# Patient Record
Sex: FEMALE | Race: WHITE | NOT HISPANIC OR LATINO | Employment: FULL TIME | URBAN - METROPOLITAN AREA
[De-identification: names, ages, dates, MRNs, and addresses within clinical notes are randomized per-mention and may not be internally consistent; named-entity substitution may affect disease eponyms.]

---

## 2023-05-04 LAB — FOLLITROPIN (IU/L) IN SER/PLAS: 7.7 IU/L

## 2023-07-27 PROBLEM — M79.18 PAIN IN RIGHT BUTTOCK: Status: ACTIVE | Noted: 2023-07-27

## 2023-07-27 PROBLEM — D22.9 MULTIPLE MELANOCYTIC NEVUS: Status: ACTIVE | Noted: 2023-07-27

## 2023-07-27 PROBLEM — I63.9 CEREBROVASCULAR ACCIDENT (CVA) (MULTI): Status: ACTIVE | Noted: 2023-07-27

## 2023-07-27 PROBLEM — N95.1 PERIMENOPAUSE: Status: ACTIVE | Noted: 2023-07-27

## 2023-07-27 PROBLEM — J45.20 ASTHMA, MILD INTERMITTENT (HHS-HCC): Status: ACTIVE | Noted: 2023-07-27

## 2023-07-27 RX ORDER — ASPIRIN 81 MG/1
1 TABLET ORAL DAILY
COMMUNITY
Start: 2017-07-18

## 2023-07-27 RX ORDER — FLUTICASONE PROPIONATE AND SALMETEROL 250; 50 UG/1; UG/1
1 POWDER RESPIRATORY (INHALATION)
COMMUNITY
Start: 2019-09-24 | End: 2023-07-31 | Stop reason: SDUPTHER

## 2023-07-27 RX ORDER — ALBUTEROL SULFATE 90 UG/1
AEROSOL, METERED RESPIRATORY (INHALATION)
COMMUNITY
Start: 2018-09-04 | End: 2023-07-31 | Stop reason: SDUPTHER

## 2023-07-31 ENCOUNTER — OFFICE VISIT (OUTPATIENT)
Dept: PRIMARY CARE | Facility: CLINIC | Age: 43
End: 2023-07-31
Payer: COMMERCIAL

## 2023-07-31 VITALS
BODY MASS INDEX: 28.7 KG/M2 | SYSTOLIC BLOOD PRESSURE: 126 MMHG | TEMPERATURE: 96.6 F | WEIGHT: 162 LBS | DIASTOLIC BLOOD PRESSURE: 68 MMHG | HEART RATE: 73 BPM | HEIGHT: 63 IN | OXYGEN SATURATION: 98 % | RESPIRATION RATE: 18 BRPM

## 2023-07-31 DIAGNOSIS — J45.20 MILD INTERMITTENT ASTHMA, UNSPECIFIED WHETHER COMPLICATED (HHS-HCC): Primary | ICD-10-CM

## 2023-07-31 DIAGNOSIS — Z86.73 HISTORY OF CVA IN ADULTHOOD: ICD-10-CM

## 2023-07-31 PROCEDURE — 1036F TOBACCO NON-USER: CPT | Performed by: INTERNAL MEDICINE

## 2023-07-31 PROCEDURE — 99214 OFFICE O/P EST MOD 30 MIN: CPT | Performed by: INTERNAL MEDICINE

## 2023-07-31 RX ORDER — FLUTICASONE PROPIONATE 50 MCG
2 SPRAY, SUSPENSION (ML) NASAL DAILY
Qty: 16 G | Refills: 11 | Status: SHIPPED | OUTPATIENT
Start: 2023-07-31 | End: 2023-08-22

## 2023-07-31 RX ORDER — FLUTICASONE PROPIONATE AND SALMETEROL 250; 50 UG/1; UG/1
1 POWDER RESPIRATORY (INHALATION)
Qty: 60 EACH | Refills: 11 | Status: SHIPPED | OUTPATIENT
Start: 2023-07-31 | End: 2024-05-13 | Stop reason: SDUPTHER

## 2023-07-31 RX ORDER — FLUTICASONE PROPIONATE 50 MCG
2 SPRAY, SUSPENSION (ML) NASAL DAILY
COMMUNITY
Start: 2015-09-03 | End: 2023-07-31 | Stop reason: SDUPTHER

## 2023-07-31 RX ORDER — ALBUTEROL SULFATE 90 UG/1
AEROSOL, METERED RESPIRATORY (INHALATION)
Qty: 18 G | Refills: 11 | Status: SHIPPED | OUTPATIENT
Start: 2023-07-31 | End: 2024-05-13 | Stop reason: SDUPTHER

## 2023-07-31 ASSESSMENT — PATIENT HEALTH QUESTIONNAIRE - PHQ9
1. LITTLE INTEREST OR PLEASURE IN DOING THINGS: NOT AT ALL
SUM OF ALL RESPONSES TO PHQ9 QUESTIONS 1 AND 2: 0
2. FEELING DOWN, DEPRESSED OR HOPELESS: NOT AT ALL

## 2023-07-31 NOTE — PROGRESS NOTES
"My nurse note reviewed. Patient is here for:  Follow-up (Discuss breathing part of CPE work physical failed.)   Pt works as  and she had respirator testing done at work as part of their assessment and she could not do lung functions test well. Also as per pt she was advised to have clearance for her hx of clot in brain and breathing.   Pt was feeling fine so she stopped all her asthma meds few months back when she ran out. Needs refill now    OBJECTIVE :  Blood pressure 126/68, pulse 73, temperature 35.9 °C (96.6 °F), resp. rate 18, height 1.6 m (5' 3\"), weight 73.5 kg (162 lb), SpO2 98 %.  CVS: S1 S2 + , no S3. No loud heart murmur appreciated. Lungs clear, No edema  CNS: Patient is alert, oriented moving all 4 extremities well. No motor weakness noted on gross neurological exam. No involuntary movements or tremors noted.  DTR ++ both knees and wrists       Assessment:  1. Mild intermittent asthma, unspecified whether complicated  albuterol 90 mcg/actuation inhaler    fluticasone (Flonase) 50 mcg/actuation nasal spray    fluticasone propion-salmeteroL (Advair Diskus) 250-50 mcg/dose diskus inhaler    Pulmonary function testing (Ancillary Performed)    XR chest 2 views    XR chest 2 views      2. History of CVA in adulthood  In 2014, no deficit now.           Plan   L;billie function test   Advised to take her medications regularly   Requested prescription/s refill done to the pharmacy of patient choice .  She is doing fine with hx of stroke standpoint.   Medical letter in regards to Neuro clearance signed. For respirator clearance will have to decide after above tests   F/U in October for physical exam   "

## 2023-07-31 NOTE — PATIENT INSTRUCTIONS
Plan   L;billie function test   Advised to take her medications regularly   Requested prescription/s refill done to the pharmacy of patient choice .  She is doing fine with hx of stroke standpoint.   Medical letter in regards to Neuro clearance signed. For respirator clearance will have to decide after above tests   F/U in October for physical exam

## 2023-08-01 ENCOUNTER — TELEPHONE (OUTPATIENT)
Dept: PRIMARY CARE | Facility: CLINIC | Age: 43
End: 2023-08-01
Payer: COMMERCIAL

## 2023-08-01 NOTE — TELEPHONE ENCOUNTER
----- Message from Leeroy Haq MD sent at 8/1/2023 10:27 AM EDT -----  Chest xray was normal. Please notify patient. Thanks.

## 2023-08-22 DIAGNOSIS — J45.20 MILD INTERMITTENT ASTHMA, UNSPECIFIED WHETHER COMPLICATED (HHS-HCC): ICD-10-CM

## 2023-08-22 RX ORDER — FLUTICASONE PROPIONATE 50 MCG
2 SPRAY, SUSPENSION (ML) NASAL DAILY
Qty: 16 ML | Refills: 11 | Status: SHIPPED | OUTPATIENT
Start: 2023-08-22

## 2023-10-16 DIAGNOSIS — Z86.73 HISTORY OF CVA IN ADULTHOOD: ICD-10-CM

## 2023-10-16 DIAGNOSIS — J45.20 MILD INTERMITTENT ASTHMA, UNSPECIFIED WHETHER COMPLICATED (HHS-HCC): Primary | ICD-10-CM

## 2023-10-19 ENCOUNTER — LAB (OUTPATIENT)
Dept: LAB | Facility: LAB | Age: 43
End: 2023-10-19
Payer: COMMERCIAL

## 2023-10-19 ENCOUNTER — OFFICE VISIT (OUTPATIENT)
Dept: PRIMARY CARE | Facility: CLINIC | Age: 43
End: 2023-10-19
Payer: COMMERCIAL

## 2023-10-19 VITALS
OXYGEN SATURATION: 95 % | DIASTOLIC BLOOD PRESSURE: 87 MMHG | WEIGHT: 160 LBS | BODY MASS INDEX: 28.35 KG/M2 | TEMPERATURE: 97.2 F | HEART RATE: 69 BPM | SYSTOLIC BLOOD PRESSURE: 129 MMHG | HEIGHT: 63 IN

## 2023-10-19 DIAGNOSIS — Z00.00 ANNUAL PHYSICAL EXAM: Primary | ICD-10-CM

## 2023-10-19 DIAGNOSIS — Z86.73 HISTORY OF CVA IN ADULTHOOD: ICD-10-CM

## 2023-10-19 DIAGNOSIS — Z12.11 ENCOUNTER FOR SCREENING FOR MALIGNANT NEOPLASM OF COLON: ICD-10-CM

## 2023-10-19 DIAGNOSIS — J45.20 MILD INTERMITTENT ASTHMA WITHOUT COMPLICATION (HHS-HCC): ICD-10-CM

## 2023-10-19 DIAGNOSIS — I63.9 CEREBROVASCULAR ACCIDENT (CVA), UNSPECIFIED MECHANISM (MULTI): ICD-10-CM

## 2023-10-19 LAB
ALBUMIN SERPL BCP-MCNC: 4.4 G/DL (ref 3.4–5)
ALP SERPL-CCNC: 39 U/L (ref 33–110)
ALT SERPL W P-5'-P-CCNC: 17 U/L (ref 7–45)
ANION GAP SERPL CALC-SCNC: 10 MMOL/L (ref 10–20)
AST SERPL W P-5'-P-CCNC: 17 U/L (ref 9–39)
BASOPHILS # BLD AUTO: 0.05 X10*3/UL (ref 0–0.1)
BASOPHILS NFR BLD AUTO: 0.6 %
BILIRUB DIRECT SERPL-MCNC: 0.1 MG/DL (ref 0–0.3)
BILIRUB SERPL-MCNC: 0.6 MG/DL (ref 0–1.2)
BUN SERPL-MCNC: 15 MG/DL (ref 6–23)
CALCIUM SERPL-MCNC: 9.2 MG/DL (ref 8.6–10.3)
CHLORIDE SERPL-SCNC: 104 MMOL/L (ref 98–107)
CHOLEST SERPL-MCNC: 216 MG/DL (ref 0–199)
CHOLESTEROL/HDL RATIO: 3.7
CO2 SERPL-SCNC: 27 MMOL/L (ref 21–32)
CREAT SERPL-MCNC: 0.92 MG/DL (ref 0.5–1.05)
EOSINOPHIL # BLD AUTO: 0.22 X10*3/UL (ref 0–0.7)
EOSINOPHIL NFR BLD AUTO: 2.8 %
ERYTHROCYTE [DISTWIDTH] IN BLOOD BY AUTOMATED COUNT: 11.9 % (ref 11.5–14.5)
GFR SERPL CREATININE-BSD FRML MDRD: 79 ML/MIN/1.73M*2
GLUCOSE SERPL-MCNC: 89 MG/DL (ref 74–99)
HCT VFR BLD AUTO: 45.4 % (ref 36–46)
HDLC SERPL-MCNC: 58 MG/DL
HGB BLD-MCNC: 15.1 G/DL (ref 12–16)
IMM GRANULOCYTES # BLD AUTO: 0.01 X10*3/UL (ref 0–0.7)
IMM GRANULOCYTES NFR BLD AUTO: 0.1 % (ref 0–0.9)
LYMPHOCYTES # BLD AUTO: 2.41 X10*3/UL (ref 1.2–4.8)
LYMPHOCYTES NFR BLD AUTO: 30.7 %
MCH RBC QN AUTO: 31.3 PG (ref 26–34)
MCHC RBC AUTO-ENTMCNC: 33.3 G/DL (ref 32–36)
MCV RBC AUTO: 94 FL (ref 80–100)
MONOCYTES # BLD AUTO: 0.62 X10*3/UL (ref 0.1–1)
MONOCYTES NFR BLD AUTO: 7.9 %
NEUTROPHILS # BLD AUTO: 4.54 X10*3/UL (ref 1.2–7.7)
NEUTROPHILS NFR BLD AUTO: 57.9 %
NON-HDL CHOLESTEROL: 158 MG/DL (ref 0–149)
NRBC BLD-RTO: 0 /100 WBCS (ref 0–0)
PLATELET # BLD AUTO: 274 X10*3/UL (ref 150–450)
PMV BLD AUTO: 10 FL (ref 7.5–11.5)
POTASSIUM SERPL-SCNC: 4.4 MMOL/L (ref 3.5–5.3)
PROT SERPL-MCNC: 7.1 G/DL (ref 6.4–8.2)
RBC # BLD AUTO: 4.83 X10*6/UL (ref 4–5.2)
SODIUM SERPL-SCNC: 137 MMOL/L (ref 136–145)
TSH SERPL-ACNC: 1.57 MIU/L (ref 0.44–3.98)
WBC # BLD AUTO: 7.9 X10*3/UL (ref 4.4–11.3)

## 2023-10-19 PROCEDURE — 1036F TOBACCO NON-USER: CPT | Performed by: INTERNAL MEDICINE

## 2023-10-19 PROCEDURE — 99396 PREV VISIT EST AGE 40-64: CPT | Performed by: INTERNAL MEDICINE

## 2023-10-19 PROCEDURE — 36415 COLL VENOUS BLD VENIPUNCTURE: CPT

## 2023-10-19 PROCEDURE — 83718 ASSAY OF LIPOPROTEIN: CPT

## 2023-10-19 PROCEDURE — 90682 RIV4 VACC RECOMBINANT DNA IM: CPT | Performed by: INTERNAL MEDICINE

## 2023-10-19 PROCEDURE — 99213 OFFICE O/P EST LOW 20 MIN: CPT | Performed by: INTERNAL MEDICINE

## 2023-10-19 PROCEDURE — 80053 COMPREHEN METABOLIC PANEL: CPT

## 2023-10-19 PROCEDURE — 82465 ASSAY BLD/SERUM CHOLESTEROL: CPT

## 2023-10-19 PROCEDURE — 84443 ASSAY THYROID STIM HORMONE: CPT

## 2023-10-19 PROCEDURE — 85025 COMPLETE CBC W/AUTO DIFF WBC: CPT

## 2023-10-19 PROCEDURE — 90471 IMMUNIZATION ADMIN: CPT | Performed by: INTERNAL MEDICINE

## 2023-10-19 PROCEDURE — 82248 BILIRUBIN DIRECT: CPT

## 2023-10-19 ASSESSMENT — PROMIS GLOBAL HEALTH SCALE
RATE_QUALITY_OF_LIFE: GOOD
RATE_AVERAGE_FATIGUE: MODERATE
CARRYOUT_PHYSICAL_ACTIVITIES: COMPLETELY
RATE_SOCIAL_SATISFACTION: GOOD
CARRYOUT_SOCIAL_ACTIVITIES: GOOD
RATE_AVERAGE_PAIN: 3
RATE_GENERAL_HEALTH: VERY GOOD
RATE_PHYSICAL_HEALTH: GOOD
EMOTIONAL_PROBLEMS: RARELY
RATE_MENTAL_HEALTH: GOOD

## 2023-10-19 NOTE — PROGRESS NOTES
"My nurse note reviewed. Patient is here for:  Annual Exam       Here for physical and f/U on asthma, stroke  Hx of stroke in 2014 . At that time had speech and visual symptoms. No weakness in arm or legs  Patient denies any shortness of breath, PND, orthopnea, chest pain , palpitation, syncope or edema in legs  patient denies any abdominal pain, tenderness, nausea, vomiting, change in bowel habits or blood in stool.  Patient denies any weakness in extremities.. Denies any headache, visual symptoms , speech problems or  tremors . No TIA or stroke like symptoms..    Taking med ok   ,   Works as    Had PFT done at [facility]. Did not see results   Taking meds ok   Has form for physical to be filled out    OBJECTIVE :  /87 (BP Location: Right arm, Patient Position: Sitting)   Pulse 69   Temp 36.2 °C (97.2 °F)   Ht 1.6 m (5' 3\")   Wt 72.6 kg (160 lb)   LMP 10/05/2023 (Approximate)   SpO2 95%   BMI 28.34 kg/m²   Vitals noted   Not in acute distress  Conj Pink, No icterus  Neck:No Cervical LN enlargement, No Thyroid enlargement No carotid bruit  Lungs: good air entry bilaterally, no rales or rhonchi  CVS: S1 S2 + , no S3. No loud heart murmur heard.   Abdomen: Soft, non tender , BS +. no organomegaly , no CVA tenderness  MSK: No spine tenderness or muscle tenderness noted on gross examination  CNS: Pt is alert, moving all 4 extremities. no motor weakness or abnormal movements noted on gross examination.  Extremities: No edema, No calf tenderness, Kang's sign negative.  Sees obgyn for well woman check .    Assessment:  1. Annual physical exam  Done. Tests orderdd      2. Mild intermittent asthma without complication  controlled   With med      3. Cerebrovascular accident (CVA), unspecified mechanism (CMS/HCC)  Hx of. No residual effect      4. Encounter for screening for malignant neoplasm of colon  Colonoscopy Screening; Average Risk Patient        Plan  Annual physical done . Form " filled  F/U with obgyn as advised  She will go for her mammogram   Colonoscopy ordered  Current medications are effective. advised to continue current medications.  Had blood test done today  Flu shot   F/U 1 yr or as needed

## 2023-10-19 NOTE — PATIENT INSTRUCTIONS
Plan  Annual physical done   F/U with obgyn as advised  She will go for her mammogram   Colonoscopy ordered  Current medications are effective. advised to continue current medications.  Had blood test done today  Flu shot   F/U1 yr or as needed

## 2023-10-23 ENCOUNTER — TELEPHONE (OUTPATIENT)
Dept: PRIMARY CARE | Facility: CLINIC | Age: 43
End: 2023-10-23
Payer: COMMERCIAL

## 2023-10-23 NOTE — TELEPHONE ENCOUNTER
----- Message from Sea Felder sent at 10/23/2023  9:38 AM EDT -----    ----- Message -----  From: Leeroy Haq MD  Sent: 10/20/2023   5:39 PM EDT  To: Sea Felder    I have reviewed recent blood tests ordered by me and it has not shown any significant abnormality except Cholesterol was 216. Normal < 200.   Plan: Low cholesterol diet and regular exercise. Let me know if any questions. Please notify patient . Thanks.

## 2023-11-13 ENCOUNTER — ANCILLARY PROCEDURE (OUTPATIENT)
Dept: RADIOLOGY | Facility: CLINIC | Age: 43
End: 2023-11-13
Payer: COMMERCIAL

## 2023-11-13 DIAGNOSIS — Z12.31 ENCOUNTER FOR SCREENING MAMMOGRAM FOR MALIGNANT NEOPLASM OF BREAST: ICD-10-CM

## 2023-11-13 PROCEDURE — 77063 BREAST TOMOSYNTHESIS BI: CPT | Mod: BILATERAL PROCEDURE | Performed by: RADIOLOGY

## 2023-11-13 PROCEDURE — 77067 SCR MAMMO BI INCL CAD: CPT | Mod: BILATERAL PROCEDURE | Performed by: RADIOLOGY

## 2023-11-13 PROCEDURE — 77063 BREAST TOMOSYNTHESIS BI: CPT

## 2024-04-11 ENCOUNTER — OFFICE VISIT (OUTPATIENT)
Dept: DERMATOLOGY | Facility: CLINIC | Age: 44
End: 2024-04-11
Payer: COMMERCIAL

## 2024-04-11 DIAGNOSIS — D18.01 HEMANGIOMA OF SKIN: ICD-10-CM

## 2024-04-11 DIAGNOSIS — L81.4 LENTIGO: ICD-10-CM

## 2024-04-11 DIAGNOSIS — Z12.83 ENCOUNTER FOR SCREENING FOR MALIGNANT NEOPLASM OF SKIN: Primary | ICD-10-CM

## 2024-04-11 DIAGNOSIS — D22.9 MELANOCYTIC NEVUS, UNSPECIFIED LOCATION: ICD-10-CM

## 2024-04-11 DIAGNOSIS — L82.1 SEBORRHEIC KERATOSIS: ICD-10-CM

## 2024-04-11 PROCEDURE — 99213 OFFICE O/P EST LOW 20 MIN: CPT | Performed by: NURSE PRACTITIONER

## 2024-04-11 PROCEDURE — 1036F TOBACCO NON-USER: CPT | Performed by: NURSE PRACTITIONER

## 2024-04-11 NOTE — PROGRESS NOTES
"Subjective     Shamika Barajas is a 43 y.o. female who presents for the following: Skin Check (Patient presents to office today for FBSE. PMHX insignificant. Denies pain, itching or bleeding. \"Zit\" to right upper cutaneous lip x 1 month, unresolved and no previous treatment(s). No further complaints.).     Review of Systems:  No other skin or systemic complaints other than what is documented elsewhere in the note.    The following portions of the chart were reviewed this encounter and updated as appropriate:   Tobacco  Allergies  Meds  Problems  Med Hx  Surg Hx  Fam Hx         Skin Cancer History  No skin cancer on file.      Specialty Problems          Dermatology Problems    Multiple melanocytic nevus        Objective   Well appearing patient in no apparent distress; mood and affect are within normal limits.    A focused skin examination was performed. All findings within normal limits unless otherwise noted below.    Assessment/Plan   1. Encounter for screening for malignant neoplasm of skin  Scattered benign lesions    - Protective measures, such as avoiding skin exposure to sunlight during peak sun hours (10 AM to 3 PM), wearing protective clothing, and applying high-SPF sunscreen, are essential for reducing exposure to harmful ultraviolet (UV) light.  - Monthly self-examination of the skin is helpful to detect new lesions or changes in existing lesions.  - Discussed signs and symptoms of sun-related skin cancers.   - Make sure your moles are not signs of skin cancer (melanoma). Remember the ABCDEs of melanoma lesions:  A - Asymmetry: One half of the lesion does not mirror the other half.  B - Border: The borders are irregular or vague (indistinct).  C - Color: More than one color may be noted within the mole.  D - Diameter: Size greater than 6 mm (roughly the size of a pencil eraser) may be concerning.  E - Evolving: Notable changes in the lesion over time are suspicious signs for skin " cancer.    2. Melanocytic nevus, unspecified location  Uniform pigmented macule(s)/papule(s) with reassuring findings on dermoscopy    -Discussed nature of condition  -Reassurance, benign-appearing features on examination today  -Recommend continued observation    3. Seborrheic keratosis  Stuck on verrucous, tan-brown papules and plaques.      Although Seborrheic Keratoses can be troublesome and unsightly, they are entirely benign.  Removal of Seborrheic Keratoses is considered a cosmetic procedure. Removal is typically performed using liquid nitrogen cryotherapy.  Treatment of current lesions does not prevent the development of new Seborrheic Keratoses in the future.    4. Hemangioma of skin  Violaceous/red papule with maroon lagoons     - A cherry hemangioma is a small macule (small, flat, smooth area) or papule (small, solid bump) formed from an overgrowth of tiny blood vessels in the skin. Cherry hemangiomas are characteristically red or purplish in color. They often first appear in middle adulthood and usually increase in number with age. Cherry hemangiomas are noncancerous (benign) and are common in adults.  - Lesions are benign, reassured patient.     5. Lentigo  Scattered tan macules in sun-exposed areas.    A solar lentigo (plural, solar lentigines), sometimes called an age spot or liver spot, is a brown macule (small, flat, smooth area of skin) caused by chronic sun or artificial ultraviolet (UV) light exposure. There may be just one lentigo or there may be multiple. This type of lentigo is different from lentigo simplex (discussed separately) because it is caused by exposure to UV light. Solar lentigines are benign, but they do indicate excessive sun exposure, a risk factor for the development of skin cancer.  Lesions are benign, no treatment needed.

## 2024-05-13 ENCOUNTER — OFFICE VISIT (OUTPATIENT)
Dept: PRIMARY CARE | Facility: CLINIC | Age: 44
End: 2024-05-13
Payer: COMMERCIAL

## 2024-05-13 VITALS
HEART RATE: 78 BPM | WEIGHT: 160 LBS | TEMPERATURE: 96.4 F | DIASTOLIC BLOOD PRESSURE: 86 MMHG | SYSTOLIC BLOOD PRESSURE: 124 MMHG | BODY MASS INDEX: 28.34 KG/M2 | OXYGEN SATURATION: 99 %

## 2024-05-13 DIAGNOSIS — J45.20 MILD INTERMITTENT ASTHMA, UNSPECIFIED WHETHER COMPLICATED (HHS-HCC): ICD-10-CM

## 2024-05-13 DIAGNOSIS — F33.1 MODERATE RECURRENT MAJOR DEPRESSION (MULTI): Primary | ICD-10-CM

## 2024-05-13 DIAGNOSIS — F41.9 ANXIETY: ICD-10-CM

## 2024-05-13 DIAGNOSIS — Z12.11 ENCOUNTER FOR SCREENING FOR MALIGNANT NEOPLASM OF COLON: ICD-10-CM

## 2024-05-13 PROCEDURE — 99214 OFFICE O/P EST MOD 30 MIN: CPT | Performed by: INTERNAL MEDICINE

## 2024-05-13 PROCEDURE — 1036F TOBACCO NON-USER: CPT | Performed by: INTERNAL MEDICINE

## 2024-05-13 RX ORDER — ALBUTEROL SULFATE 90 UG/1
AEROSOL, METERED RESPIRATORY (INHALATION)
Qty: 18 G | Refills: 11 | Status: SHIPPED | OUTPATIENT
Start: 2024-05-13

## 2024-05-13 RX ORDER — FLUTICASONE PROPIONATE AND SALMETEROL 250; 50 UG/1; UG/1
1 POWDER RESPIRATORY (INHALATION)
Qty: 60 EACH | Refills: 11 | Status: SHIPPED | OUTPATIENT
Start: 2024-05-13

## 2024-05-13 RX ORDER — ESCITALOPRAM OXALATE 10 MG/1
10 TABLET ORAL DAILY
Qty: 30 TABLET | Refills: 5 | Status: SHIPPED | OUTPATIENT
Start: 2024-05-13 | End: 2024-11-09

## 2024-05-13 ASSESSMENT — PATIENT HEALTH QUESTIONNAIRE - PHQ9
9. THOUGHTS THAT YOU WOULD BE BETTER OFF DEAD, OR OF HURTING YOURSELF: NOT AT ALL
8. MOVING OR SPEAKING SO SLOWLY THAT OTHER PEOPLE COULD HAVE NOTICED. OR THE OPPOSITE, BEING SO FIGETY OR RESTLESS THAT YOU HAVE BEEN MOVING AROUND A LOT MORE THAN USUAL: NOT AT ALL
5. POOR APPETITE OR OVEREATING: SEVERAL DAYS
6. FEELING BAD ABOUT YOURSELF - OR THAT YOU ARE A FAILURE OR HAVE LET YOURSELF OR YOUR FAMILY DOWN: SEVERAL DAYS
1. LITTLE INTEREST OR PLEASURE IN DOING THINGS: MORE THAN HALF THE DAYS
3. TROUBLE FALLING OR STAYING ASLEEP OR SLEEPING TOO MUCH: SEVERAL DAYS
SUM OF ALL RESPONSES TO PHQ9 QUESTIONS 1 AND 2: 4
5. POOR APPETITE OR OVEREATING: SEVERAL DAYS
7. TROUBLE CONCENTRATING ON THINGS, SUCH AS READING THE NEWSPAPER OR WATCHING TELEVISION: MORE THAN HALF THE DAYS
SUM OF ALL RESPONSES TO PHQ9 QUESTIONS 1 AND 2: 3
4. FEELING TIRED OR HAVING LITTLE ENERGY: MORE THAN HALF THE DAYS
1. LITTLE INTEREST OR PLEASURE IN DOING THINGS: SEVERAL DAYS
2. FEELING DOWN, DEPRESSED OR HOPELESS: MORE THAN HALF THE DAYS
7. TROUBLE CONCENTRATING ON THINGS, SUCH AS READING THE NEWSPAPER OR WATCHING TELEVISION: MORE THAN HALF THE DAYS
4. FEELING TIRED OR HAVING LITTLE ENERGY: SEVERAL DAYS
6. FEELING BAD ABOUT YOURSELF - OR THAT YOU ARE A FAILURE OR HAVE LET YOURSELF OR YOUR FAMILY DOWN: NOT AT ALL
SUM OF ALL RESPONSES TO PHQ9 QUESTIONS 1 AND 2: 4
9. THOUGHTS THAT YOU WOULD BE BETTER OFF DEAD, OR OF HURTING YOURSELF: NOT AT ALL
8. MOVING OR SPEAKING SO SLOWLY THAT OTHER PEOPLE COULD HAVE NOTICED. OR THE OPPOSITE, BEING SO FIGETY OR RESTLESS THAT YOU HAVE BEEN MOVING AROUND A LOT MORE THAN USUAL: NOT AT ALL
2. FEELING DOWN, DEPRESSED OR HOPELESS: MORE THAN HALF THE DAYS
1. LITTLE INTEREST OR PLEASURE IN DOING THINGS: MORE THAN HALF THE DAYS
SUM OF ALL RESPONSES TO PHQ QUESTIONS 1-9: 9
2. FEELING DOWN, DEPRESSED OR HOPELESS: MORE THAN HALF THE DAYS
10. IF YOU CHECKED OFF ANY PROBLEMS, HOW DIFFICULT HAVE THESE PROBLEMS MADE IT FOR YOU TO DO YOUR WORK, TAKE CARE OF THINGS AT HOME, OR GET ALONG WITH OTHER PEOPLE: VERY DIFFICULT

## 2024-05-13 NOTE — PATIENT INSTRUCTIONS
Plan  Started on new med for anxiety  and depression   PHQ score was 9 today . Will monitor it  Side effects of medication were discussed. All questions related to medication answered to patient's satisfaction  Colonoscopy order placed   F/U in July for follow up on Depression   Requested prescription/s refill done to the pharmacy of patient choice .  Patient felt satisfied with plan

## 2024-05-13 NOTE — PROGRESS NOTES
My nurse note reviewed. Patient is here for:  Depression (Depression medication/New referral colonoscopy/)       Feeling depression and anxiety . Has hx of seasonal affective disorder in past . Was on med about 10 yrs ago . Thinks she would need something for it.   Has to travel a lot for her work to out of town  Lives with her   Non smoker  Feeling tired or having little energy: More than half the days  Poor appetite or overeating: Several days  Feeling bad about yourself - or that you are a failure or have let yourself or your family down: Several days  Trouble concentrating on things, such as reading the newspaper or watching television: More than half the days  Moving or speaking so slowly that other people could have noticed? Or the opposite - being so fidgety or restless that you have been moving around a lot more than usual.: Not at all  Thoughts that you would be better off dead or hurting yourself in some way: Not at all   Score is 9      Patient denies any shortness of breath, PND, orthopnea, chest pain , palpitation, syncope or edema in legs    OBJECTIVE :  Pulse 78, temperature 35.8 °C (96.4 °F), weight 72.6 kg (160 lb), SpO2 99%.  CVS: S1 S2 + , no S3. No loud heart murmur appreciated. Lungs clear, No edema  CNS: Patient is alert, oriented moving all 4 extremities well. No motor weakness noted on gross neurological exam. No involuntary movements or tremors noted.    Assessment:  1. Moderate recurrent major depression (Multi)  escitalopram (Lexapro) 10 mg tablet      2. Mild intermittent asthma, unspecified whether complicated (HHS-HCC)  fluticasone propion-salmeteroL (Advair Diskus) 250-50 mcg/dose diskus inhaler    albuterol 90 mcg/actuation inhaler      3. Anxiety  escitalopram (Lexapro) 10 mg tablet        Plan  Started on new med for anxiety  and depression   Colonoscopy order placed  PHQ score was 9 today . Will monitor it  Side effects of medication were discussed. All questions related to  medication answered to patient's satisfaction  F/U in July for follow up on Depression   Requested prescription/s refill done to the pharmacy of patient choice .  Patient felt satisfied with plan

## 2024-07-03 DIAGNOSIS — J45.20 MILD INTERMITTENT ASTHMA, UNSPECIFIED WHETHER COMPLICATED (HHS-HCC): ICD-10-CM

## 2024-07-03 RX ORDER — FLUTICASONE PROPIONATE AND SALMETEROL 250; 50 UG/1; UG/1
1 POWDER RESPIRATORY (INHALATION) 2 TIMES DAILY
Qty: 180 EACH | Refills: 4 | Status: SHIPPED | OUTPATIENT
Start: 2024-07-03

## 2024-07-08 ENCOUNTER — APPOINTMENT (OUTPATIENT)
Dept: PRIMARY CARE | Facility: CLINIC | Age: 44
End: 2024-07-08
Payer: COMMERCIAL

## 2024-07-08 VITALS
BODY MASS INDEX: 28.34 KG/M2 | WEIGHT: 160 LBS | SYSTOLIC BLOOD PRESSURE: 104 MMHG | HEART RATE: 57 BPM | OXYGEN SATURATION: 100 % | TEMPERATURE: 97.3 F | DIASTOLIC BLOOD PRESSURE: 72 MMHG

## 2024-07-08 DIAGNOSIS — F33.0 MILD RECURRENT MAJOR DEPRESSION (CMS-HCC): Primary | ICD-10-CM

## 2024-07-08 PROCEDURE — 99213 OFFICE O/P EST LOW 20 MIN: CPT | Performed by: INTERNAL MEDICINE

## 2024-07-08 PROCEDURE — 1036F TOBACCO NON-USER: CPT | Performed by: INTERNAL MEDICINE

## 2024-07-08 ASSESSMENT — PATIENT HEALTH QUESTIONNAIRE - PHQ9
1. LITTLE INTEREST OR PLEASURE IN DOING THINGS: NOT AT ALL
2. FEELING DOWN, DEPRESSED OR HOPELESS: NOT AT ALL
8. MOVING OR SPEAKING SO SLOWLY THAT OTHER PEOPLE COULD HAVE NOTICED. OR THE OPPOSITE, BEING SO FIGETY OR RESTLESS THAT YOU HAVE BEEN MOVING AROUND A LOT MORE THAN USUAL: NOT AT ALL
9. THOUGHTS THAT YOU WOULD BE BETTER OFF DEAD, OR OF HURTING YOURSELF: NOT AT ALL
SUM OF ALL RESPONSES TO PHQ QUESTIONS 1-9: 4
3. TROUBLE FALLING OR STAYING ASLEEP OR SLEEPING TOO MUCH: NOT AT ALL
SUM OF ALL RESPONSES TO PHQ9 QUESTIONS 1 AND 2: 2
SUM OF ALL RESPONSES TO PHQ9 QUESTIONS 1 AND 2: 0
1. LITTLE INTEREST OR PLEASURE IN DOING THINGS: SEVERAL DAYS
4. FEELING TIRED OR HAVING LITTLE ENERGY: SEVERAL DAYS
6. FEELING BAD ABOUT YOURSELF - OR THAT YOU ARE A FAILURE OR HAVE LET YOURSELF OR YOUR FAMILY DOWN: NOT AT ALL
2. FEELING DOWN, DEPRESSED OR HOPELESS: SEVERAL DAYS
7. TROUBLE CONCENTRATING ON THINGS, SUCH AS READING THE NEWSPAPER OR WATCHING TELEVISION: SEVERAL DAYS
5. POOR APPETITE OR OVEREATING: NOT AT ALL
10. IF YOU CHECKED OFF ANY PROBLEMS, HOW DIFFICULT HAVE THESE PROBLEMS MADE IT FOR YOU TO DO YOUR WORK, TAKE CARE OF THINGS AT HOME, OR GET ALONG WITH OTHER PEOPLE: NOT DIFFICULT AT ALL

## 2024-07-08 NOTE — PROGRESS NOTES
My nurse note reviewed. Patient is here for:  Follow-up (Depression is good.)    Patient  is here for follow up on depression   tolerating medication well  no side effects. no chest pain, palpitation , dizziness or passing out   no crying spells , no suicidal thoughts   overall feels better on medication  Able to concentrate better   Works as     OBJECTIVE :  /72   Pulse 57   Temp 36.3 °C (97.3 °F)   Wt 72.6 kg (160 lb)   SpO2 100%   BMI 28.34 kg/m²   CVS: S1 S2 + , no S3. No loud heart murmur appreciated. Lungs clear, No edema  Doesnot look depressed       Trouble falling or staying asleep, or sleeping too much: Not at all  Feeling tired or having little energy: Several days  Poor appetite or overeating: Not at all  Feeling bad about yourself - or that you are a failure or have let yourself or your family down: Not at all  Trouble concentrating on things, such as reading the newspaper or watching television: Several days  Moving or speaking so slowly that other people could have noticed? Or the opposite - being so fidgety or restless that you have been moving around a lot more than usual.: Not at all  Thoughts that you would be better off dead or hurting yourself in some way: Not at all  Patient Health Questionnaire-9 Score: 4      Assessment:  1. Mild recurrent major depression (CMS-HCC) - doing better     Plan  Depression symptoms are better now  Current medications are effective. advised to continue current medications.  F/U in October for physical

## 2024-07-08 NOTE — PATIENT INSTRUCTIONS
Plan  Depression symptoms are better now  Current medications are effective. advised to continue current medications.  F/U in October for physical

## 2024-07-31 ENCOUNTER — HOSPITAL ENCOUNTER (OUTPATIENT)
Dept: GASTROENTEROLOGY | Facility: HOSPITAL | Age: 44
Setting detail: OUTPATIENT SURGERY
Discharge: HOME | End: 2024-07-31
Payer: COMMERCIAL

## 2024-07-31 VITALS
HEIGHT: 63 IN | RESPIRATION RATE: 18 BRPM | DIASTOLIC BLOOD PRESSURE: 82 MMHG | TEMPERATURE: 97.2 F | HEART RATE: 56 BPM | OXYGEN SATURATION: 95 % | WEIGHT: 160 LBS | BODY MASS INDEX: 28.35 KG/M2 | SYSTOLIC BLOOD PRESSURE: 116 MMHG

## 2024-07-31 DIAGNOSIS — Z12.11 ENCOUNTER FOR SCREENING FOR MALIGNANT NEOPLASM OF COLON: ICD-10-CM

## 2024-07-31 LAB — PREGNANCY TEST URINE, POC: NEGATIVE

## 2024-07-31 PROCEDURE — 7100000010 HC PHASE TWO TIME - EACH INCREMENTAL 1 MINUTE

## 2024-07-31 PROCEDURE — 3700000012 HC SEDATION LEVEL 5+ TIME - INITIAL 15 MINUTES 5/> YEARS

## 2024-07-31 PROCEDURE — 99153 MOD SED SAME PHYS/QHP EA: CPT | Performed by: STUDENT IN AN ORGANIZED HEALTH CARE EDUCATION/TRAINING PROGRAM

## 2024-07-31 PROCEDURE — 99152 MOD SED SAME PHYS/QHP 5/>YRS: CPT | Performed by: STUDENT IN AN ORGANIZED HEALTH CARE EDUCATION/TRAINING PROGRAM

## 2024-07-31 PROCEDURE — 45385 COLONOSCOPY W/LESION REMOVAL: CPT | Performed by: STUDENT IN AN ORGANIZED HEALTH CARE EDUCATION/TRAINING PROGRAM

## 2024-07-31 PROCEDURE — 7100000009 HC PHASE TWO TIME - INITIAL BASE CHARGE

## 2024-07-31 PROCEDURE — 3700000013 HC SEDATION LEVEL 5+ TIME - EACH ADDITIONAL 15 MINUTES

## 2024-07-31 PROCEDURE — 2500000004 HC RX 250 GENERAL PHARMACY W/ HCPCS (ALT 636 FOR OP/ED): Performed by: STUDENT IN AN ORGANIZED HEALTH CARE EDUCATION/TRAINING PROGRAM

## 2024-07-31 RX ORDER — SODIUM CHLORIDE, SODIUM LACTATE, POTASSIUM CHLORIDE, CALCIUM CHLORIDE 600; 310; 30; 20 MG/100ML; MG/100ML; MG/100ML; MG/100ML
20 INJECTION, SOLUTION INTRAVENOUS CONTINUOUS
Status: DISCONTINUED | OUTPATIENT
Start: 2024-07-31 | End: 2024-08-01 | Stop reason: HOSPADM

## 2024-07-31 RX ORDER — FENTANYL CITRATE 50 UG/ML
INJECTION, SOLUTION INTRAMUSCULAR; INTRAVENOUS AS NEEDED
Status: COMPLETED | OUTPATIENT
Start: 2024-07-31 | End: 2024-07-31

## 2024-07-31 RX ORDER — MIDAZOLAM HYDROCHLORIDE 1 MG/ML
INJECTION, SOLUTION INTRAMUSCULAR; INTRAVENOUS AS NEEDED
Status: COMPLETED | OUTPATIENT
Start: 2024-07-31 | End: 2024-07-31

## 2024-07-31 ASSESSMENT — PAIN SCALES - GENERAL
PAINLEVEL_OUTOF10: 0 - NO PAIN

## 2024-07-31 ASSESSMENT — COLUMBIA-SUICIDE SEVERITY RATING SCALE - C-SSRS
1. IN THE PAST MONTH, HAVE YOU WISHED YOU WERE DEAD OR WISHED YOU COULD GO TO SLEEP AND NOT WAKE UP?: NO
6. HAVE YOU EVER DONE ANYTHING, STARTED TO DO ANYTHING, OR PREPARED TO DO ANYTHING TO END YOUR LIFE?: NO
2. HAVE YOU ACTUALLY HAD ANY THOUGHTS OF KILLING YOURSELF?: NO

## 2024-07-31 ASSESSMENT — PAIN - FUNCTIONAL ASSESSMENT
PAIN_FUNCTIONAL_ASSESSMENT: 0-10
PAIN_FUNCTIONAL_ASSESSMENT: UNABLE TO SELF-REPORT
PAIN_FUNCTIONAL_ASSESSMENT: UNABLE TO SELF-REPORT
PAIN_FUNCTIONAL_ASSESSMENT: 0-10
PAIN_FUNCTIONAL_ASSESSMENT: UNABLE TO SELF-REPORT
PAIN_FUNCTIONAL_ASSESSMENT: 0-10
PAIN_FUNCTIONAL_ASSESSMENT: UNABLE TO SELF-REPORT
PAIN_FUNCTIONAL_ASSESSMENT: UNABLE TO SELF-REPORT
PAIN_FUNCTIONAL_ASSESSMENT: 0-10
PAIN_FUNCTIONAL_ASSESSMENT: UNABLE TO SELF-REPORT
PAIN_FUNCTIONAL_ASSESSMENT: 0-10
PAIN_FUNCTIONAL_ASSESSMENT: UNABLE TO SELF-REPORT
PAIN_FUNCTIONAL_ASSESSMENT: 0-10
PAIN_FUNCTIONAL_ASSESSMENT: 0-10

## 2024-07-31 NOTE — H&P
History Of Present Illness  Shamika Barajas is a 43 y.o. female with PMH asthma, remote CVA presenting for index colonoscopy for colorectal cancer screening.    She denies constipation, diarrhea, abdominal pain, rectal pain, weight loss, melena, hematochezia.    No family history of GI cancers or advanced adenomas.    No tobacco use, no significant alcohol use.     Past Medical History  History reviewed. No pertinent past medical history.  Surgical History  History reviewed. No pertinent surgical history.  Social History  She reports that she has never smoked. She has never used smokeless tobacco. She reports current alcohol use. She reports that she does not use drugs.    Family History  Family History   Problem Relation Name Age of Onset    Diabetes Mother      Thyroid disease Mother      Heart disease Father      Thyroid disease Mother's Sister      Lupus Mother's Sister      Prostate cancer Father's Brother      Ovarian cancer Maternal Grandmother      Breast cancer Maternal Grandmother      Pancreatic cancer Paternal Grandmother      Prostate cancer Paternal Grandfather          Allergies  Allergies   Allergen Reactions    Milk Containing Products (Dairy) Unknown    Tree And Shrub Pollen Itching     Review of Systems   All other systems reviewed and are negative.    Pre-sedation Evaluation:  ASA Classification - ASA 2 - Patient with mild systemic disease with no functional limitations  Mallampati Score - II (hard and soft palate, upper portion of tonsils and uvula visible)    Physical Exam  Constitutional:       Appearance: Normal appearance. She is normal weight.   HENT:      Head: Normocephalic and atraumatic.      Nose: Nose normal.      Mouth/Throat:      Mouth: Mucous membranes are moist.      Pharynx: Oropharynx is clear.   Eyes:      General: No scleral icterus.     Extraocular Movements: Extraocular movements intact.      Conjunctiva/sclera: Conjunctivae normal.   Cardiovascular:      Rate and  "Rhythm: Normal rate and regular rhythm.      Pulses: Normal pulses.      Heart sounds: Normal heart sounds.   Pulmonary:      Effort: Pulmonary effort is normal.      Breath sounds: Normal breath sounds.   Abdominal:      General: Abdomen is flat. Bowel sounds are normal. There is no distension.      Palpations: Abdomen is soft. There is no mass.      Tenderness: There is no abdominal tenderness. There is no guarding or rebound.   Skin:     General: Skin is warm and dry.   Neurological:      Mental Status: She is alert and oriented to person, place, and time. Mental status is at baseline.          Last Recorded Vitals  Blood pressure 124/86, pulse 75, temperature 36.2 °C (97.2 °F), resp. rate 18, height 1.6 m (5' 3\"), weight 72.6 kg (160 lb), SpO2 97%.     Assessment/Plan   Shamika Barajas is a 43 y.o. female with PMH asthma, remote CVA presenting for index colonoscopy for colorectal cancer screening. She is asymptomatic and at average risk of colorectal cancer.    #Colorectal Cancer Screening, average risk  -Will proceed with colonoscopy under moderate sedation today.    Ortiz Shaikh MD  Gastroenterology Fellow       PTA/Current Medications:  (Not in a hospital admission)    Current Outpatient Medications   Medication Sig Dispense Refill    aspirin 81 mg EC tablet Take 1 tablet (81 mg) by mouth once daily.      escitalopram (Lexapro) 10 mg tablet Take 1 tablet (10 mg) by mouth once daily. 30 tablet 5    fluticasone propion-salmeteroL (Advair Diskus) 250-50 mcg/dose diskus inhaler INHALE 1 PUFF BY MOUTH 2 TIMES A  each 4    albuterol 90 mcg/actuation inhaler INHALE 1 TO 2 PUFFS EVERY 4 TO 6 HOURS AS NEEDED. (Patient not taking: Reported on 7/31/2024) 18 g 11    fluticasone (Flonase) 50 mcg/actuation nasal spray ADMINISTER 2 SPRAYS INTO EACH NOSTRIL ONCE DAILY. (Patient not taking: Reported on 7/31/2024) 16 mL 11     Current Facility-Administered Medications   Medication Dose Route Frequency Provider " Last Rate Last Admin    lactated Ringer's infusion  20 mL/hr intravenous Continuous MD Ortiz Feng MD

## 2024-08-06 LAB
LABORATORY COMMENT REPORT: NORMAL
PATH REPORT.FINAL DX SPEC: NORMAL
PATH REPORT.GROSS SPEC: NORMAL
PATH REPORT.TOTAL CANCER: NORMAL

## 2024-09-04 DIAGNOSIS — F41.9 ANXIETY: ICD-10-CM

## 2024-09-04 DIAGNOSIS — F33.1 MODERATE RECURRENT MAJOR DEPRESSION (MULTI): ICD-10-CM

## 2024-09-05 DIAGNOSIS — F33.1 MODERATE RECURRENT MAJOR DEPRESSION (MULTI): ICD-10-CM

## 2024-09-05 DIAGNOSIS — F41.9 ANXIETY: ICD-10-CM

## 2024-09-05 RX ORDER — ESCITALOPRAM OXALATE 10 MG/1
10 TABLET ORAL DAILY
Qty: 30 TABLET | Refills: 5 | Status: SHIPPED | OUTPATIENT
Start: 2024-09-05 | End: 2025-03-04

## 2024-09-12 ENCOUNTER — TELEPHONE (OUTPATIENT)
Dept: PRIMARY CARE | Facility: CLINIC | Age: 44
End: 2024-09-12
Payer: COMMERCIAL

## 2024-09-12 RX ORDER — ESCITALOPRAM OXALATE 10 MG/1
10 TABLET ORAL DAILY
Qty: 90 TABLET | Refills: 1 | OUTPATIENT
Start: 2024-09-12

## 2024-10-21 ENCOUNTER — APPOINTMENT (OUTPATIENT)
Dept: PRIMARY CARE | Facility: CLINIC | Age: 44
End: 2024-10-21
Payer: COMMERCIAL

## 2024-10-21 ENCOUNTER — LAB (OUTPATIENT)
Dept: LAB | Facility: LAB | Age: 44
End: 2024-10-21
Payer: COMMERCIAL

## 2024-10-21 VITALS
BODY MASS INDEX: 28.34 KG/M2 | SYSTOLIC BLOOD PRESSURE: 110 MMHG | OXYGEN SATURATION: 96 % | DIASTOLIC BLOOD PRESSURE: 68 MMHG | WEIGHT: 160 LBS | HEART RATE: 72 BPM | TEMPERATURE: 97.2 F

## 2024-10-21 DIAGNOSIS — R23.2 HOT FLASHES: ICD-10-CM

## 2024-10-21 DIAGNOSIS — Z12.31 ENCOUNTER FOR SCREENING MAMMOGRAM FOR BREAST CANCER: ICD-10-CM

## 2024-10-21 DIAGNOSIS — F41.9 ANXIETY: ICD-10-CM

## 2024-10-21 DIAGNOSIS — F33.0 MILD RECURRENT MAJOR DEPRESSION (CMS-HCC): ICD-10-CM

## 2024-10-21 DIAGNOSIS — Z00.00 ANNUAL PHYSICAL EXAM: ICD-10-CM

## 2024-10-21 DIAGNOSIS — J45.20 MILD INTERMITTENT ASTHMA, UNSPECIFIED WHETHER COMPLICATED (HHS-HCC): ICD-10-CM

## 2024-10-21 DIAGNOSIS — Z86.73 HISTORY OF CVA IN ADULTHOOD: ICD-10-CM

## 2024-10-21 DIAGNOSIS — F33.1 MODERATE RECURRENT MAJOR DEPRESSION: ICD-10-CM

## 2024-10-21 DIAGNOSIS — Z00.00 ANNUAL PHYSICAL EXAM: Primary | ICD-10-CM

## 2024-10-21 LAB
ALBUMIN SERPL BCP-MCNC: 4.6 G/DL (ref 3.4–5)
ALP SERPL-CCNC: 39 U/L (ref 33–110)
ALT SERPL W P-5'-P-CCNC: 26 U/L (ref 7–45)
ANION GAP SERPL CALC-SCNC: 12 MMOL/L (ref 10–20)
AST SERPL W P-5'-P-CCNC: 20 U/L (ref 9–39)
BASOPHILS # BLD AUTO: 0.05 X10*3/UL (ref 0–0.1)
BASOPHILS NFR BLD AUTO: 0.5 %
BILIRUB DIRECT SERPL-MCNC: 0.1 MG/DL (ref 0–0.3)
BILIRUB SERPL-MCNC: 0.7 MG/DL (ref 0–1.2)
BUN SERPL-MCNC: 11 MG/DL (ref 6–23)
CALCIUM SERPL-MCNC: 9.7 MG/DL (ref 8.6–10.3)
CHLORIDE SERPL-SCNC: 105 MMOL/L (ref 98–107)
CHOLEST SERPL-MCNC: 241 MG/DL (ref 0–199)
CHOLESTEROL/HDL RATIO: 3.7
CO2 SERPL-SCNC: 24 MMOL/L (ref 21–32)
CREAT SERPL-MCNC: 0.73 MG/DL (ref 0.5–1.05)
EGFRCR SERPLBLD CKD-EPI 2021: >90 ML/MIN/1.73M*2
EOSINOPHIL # BLD AUTO: 0.14 X10*3/UL (ref 0–0.7)
EOSINOPHIL NFR BLD AUTO: 1.5 %
ERYTHROCYTE [DISTWIDTH] IN BLOOD BY AUTOMATED COUNT: 12.3 % (ref 11.5–14.5)
FSH SERPL-ACNC: 101.5 IU/L
GLUCOSE SERPL-MCNC: 87 MG/DL (ref 74–99)
HCT VFR BLD AUTO: 45.3 % (ref 36–46)
HDLC SERPL-MCNC: 65.4 MG/DL
HGB BLD-MCNC: 15.2 G/DL (ref 12–16)
IMM GRANULOCYTES # BLD AUTO: 0.04 X10*3/UL (ref 0–0.7)
IMM GRANULOCYTES NFR BLD AUTO: 0.4 % (ref 0–0.9)
LH SERPL-ACNC: 33.8 IU/L
LYMPHOCYTES # BLD AUTO: 1.85 X10*3/UL (ref 1.2–4.8)
LYMPHOCYTES NFR BLD AUTO: 19.2 %
MCH RBC QN AUTO: 31.2 PG (ref 26–34)
MCHC RBC AUTO-ENTMCNC: 33.6 G/DL (ref 32–36)
MCV RBC AUTO: 93 FL (ref 80–100)
MONOCYTES # BLD AUTO: 0.76 X10*3/UL (ref 0.1–1)
MONOCYTES NFR BLD AUTO: 7.9 %
NEUTROPHILS # BLD AUTO: 6.78 X10*3/UL (ref 1.2–7.7)
NEUTROPHILS NFR BLD AUTO: 70.5 %
NON-HDL CHOLESTEROL: 176 MG/DL (ref 0–149)
NRBC BLD-RTO: 0 /100 WBCS (ref 0–0)
PLATELET # BLD AUTO: 288 X10*3/UL (ref 150–450)
POTASSIUM SERPL-SCNC: 4 MMOL/L (ref 3.5–5.3)
PROT SERPL-MCNC: 7.4 G/DL (ref 6.4–8.2)
RBC # BLD AUTO: 4.87 X10*6/UL (ref 4–5.2)
SODIUM SERPL-SCNC: 137 MMOL/L (ref 136–145)
TSH SERPL-ACNC: 1.78 MIU/L (ref 0.44–3.98)
WBC # BLD AUTO: 9.6 X10*3/UL (ref 4.4–11.3)

## 2024-10-21 PROCEDURE — 85025 COMPLETE CBC W/AUTO DIFF WBC: CPT

## 2024-10-21 PROCEDURE — 82672 ASSAY OF ESTROGEN: CPT

## 2024-10-21 PROCEDURE — 82465 ASSAY BLD/SERUM CHOLESTEROL: CPT

## 2024-10-21 PROCEDURE — 36415 COLL VENOUS BLD VENIPUNCTURE: CPT

## 2024-10-21 PROCEDURE — 82248 BILIRUBIN DIRECT: CPT

## 2024-10-21 PROCEDURE — 83002 ASSAY OF GONADOTROPIN (LH): CPT

## 2024-10-21 PROCEDURE — 84443 ASSAY THYROID STIM HORMONE: CPT

## 2024-10-21 PROCEDURE — 83001 ASSAY OF GONADOTROPIN (FSH): CPT

## 2024-10-21 PROCEDURE — 83718 ASSAY OF LIPOPROTEIN: CPT

## 2024-10-21 PROCEDURE — 99213 OFFICE O/P EST LOW 20 MIN: CPT | Performed by: INTERNAL MEDICINE

## 2024-10-21 PROCEDURE — 99396 PREV VISIT EST AGE 40-64: CPT | Performed by: INTERNAL MEDICINE

## 2024-10-21 PROCEDURE — 80053 COMPREHEN METABOLIC PANEL: CPT

## 2024-10-21 RX ORDER — ESCITALOPRAM OXALATE 10 MG/1
10 TABLET ORAL DAILY
Qty: 90 TABLET | Refills: 3 | Status: SHIPPED | OUTPATIENT
Start: 2024-10-21 | End: 2025-10-21

## 2024-10-21 NOTE — PATIENT INSTRUCTIONS
Plan  Annual physical done  Had flu shot   Upto date on colon test  Mammogram   Requested prescription/s refill done to the pharmacy of patient choice .  Blood tests ordered. She will go for it today   Current medications are effective. advised to continue current medications.  F/U 1 yr or as needed

## 2024-10-21 NOTE — PROGRESS NOTES
My nurse note reviewed. Patient is here for:  Establish Care and Annual Exam (Physical. /Hrt /.)       Here for physical and f/U on asthma, stroke  Hx of stroke in 2014 . At that time had speech and visual symptoms but no weakness in arm or legs.     Gets periods every 3-4 months or so. Gets hot flashes off and on .     Patient denies any shortness of breath, PND, orthopnea, chest pain , palpitation, syncope or edema in legs  patient denies any abdominal pain, tenderness, nausea, vomiting, change in bowel habits or blood in stool.  Patient denies any weakness in extremities.. Denies any headache, visual symptoms , speech problems or  tremors . No TIA or stroke like symptoms..    Had colonoscopy and had polyp, advised to repeat in 7 yrs.     I have reviewed all active medications patient is currently on . Questions related to medication answered to patient's satisfaction.    Non smoker, occ drinking   Lives with  , no children   REVIEW OF SYSTEMS:   All other systems have been reviewed and are negative in relation to patient's complaint and other than what is mentioned in History of present illness.      OBJECTIVE :/68   Pulse 72   Temp 36.2 °C (97.2 °F)   Wt 72.6 kg (160 lb)   SpO2 96%   BMI 28.34 kg/m²    Vitals noted   Not in acute distress  Conj Pink, No icterus  Neck:No Cervical LN enlargement, No Thyroid enlargement No carotid bruit  Lungs: good air entry bilaterally, no rales or rhonchi  CVS: S1 S2 + , no S3. No loud heart murmur heard.   Breast examination: Breasts are symmetric. No dominant or discrete suspicious masses noted in breast area.  No nipple changes or discharge noted.  Abdomen: Soft, non tender , BS +. no organomegaly , no CVA tenderness  MSK: No spine tenderness or muscle tenderness noted on gross examination  CNS: Pt is alert, moving all 4 extremities. no motor weakness or abnormal movements noted on gross examination.  Extremities: No edema, No calf tenderness, Kang's sign  negative.    Assessment:  1. Annual physical exam  CBC and Auto Differential    Basic Metabolic Panel    Hepatic Function Panel    Lipid Panel Non-Fasting    Thyroid Stimulating Hormone      2. Mild recurrent major depression (CMS-HCC)  Stable on med. Continue it. Refill done      3. Mild intermittent asthma, unspecified whether complicated (HHS-HCC)  controlled  With meds.       4. Anxiety  Doing ok       5. History of CVA in adulthood  Many yrs ago . No residual deficit.       6. Encounter for screening mammogram for breast cancer  BI mammo bilateral screening tomosynthesis      7. Hot flashes  Thyroid Stimulating Hormone    Luteinizing hormone    Estrogens, Total    FSH        Plan  Annual physical done  Had flu shot   Upto date on colon test  Mammogram   Requested prescription/s refill done to the pharmacy of patient choice .  Blood tests ordered. She will go for it today   Current medications are effective. advised to continue current medications.  Questions related to HRT answered. Will do blood tests. She will follow up with her obgyn also .   F/U 1 yr or as needed

## 2024-10-25 DIAGNOSIS — J45.20 MILD INTERMITTENT ASTHMA, UNSPECIFIED WHETHER COMPLICATED (HHS-HCC): ICD-10-CM

## 2024-10-26 LAB — ESTROGEN SERPL-MCNC: 48 PG/ML

## 2024-10-26 RX ORDER — FLUTICASONE PROPIONATE 50 MCG
2 SPRAY, SUSPENSION (ML) NASAL DAILY
Qty: 48 ML | Refills: 0 | Status: SHIPPED | OUTPATIENT
Start: 2024-10-26

## 2024-10-31 ENCOUNTER — TELEPHONE (OUTPATIENT)
Dept: PRIMARY CARE | Facility: CLINIC | Age: 44
End: 2024-10-31
Payer: COMMERCIAL

## 2024-11-27 ENCOUNTER — TELEPHONE (OUTPATIENT)
Dept: PRIMARY CARE | Facility: CLINIC | Age: 44
End: 2024-11-27
Payer: COMMERCIAL

## 2024-11-27 ENCOUNTER — APPOINTMENT (OUTPATIENT)
Dept: RADIOLOGY | Facility: CLINIC | Age: 44
End: 2024-11-27
Payer: COMMERCIAL

## 2024-11-27 NOTE — TELEPHONE ENCOUNTER
Patient Called stating that the tested positive for covid and was wondering if a script for paxlovid could be ordered. Please advise

## 2024-12-11 ENCOUNTER — HOSPITAL ENCOUNTER (OUTPATIENT)
Dept: RADIOLOGY | Facility: CLINIC | Age: 44
Discharge: HOME | End: 2024-12-11
Payer: COMMERCIAL

## 2024-12-11 VITALS — WEIGHT: 160 LBS | HEIGHT: 63 IN | BODY MASS INDEX: 28.35 KG/M2

## 2024-12-11 DIAGNOSIS — Z12.31 ENCOUNTER FOR SCREENING MAMMOGRAM FOR BREAST CANCER: ICD-10-CM

## 2024-12-11 PROCEDURE — 77067 SCR MAMMO BI INCL CAD: CPT

## 2024-12-11 PROCEDURE — 77067 SCR MAMMO BI INCL CAD: CPT | Performed by: RADIOLOGY

## 2024-12-11 PROCEDURE — 77063 BREAST TOMOSYNTHESIS BI: CPT | Performed by: RADIOLOGY

## 2024-12-12 DIAGNOSIS — R92.8 ABNORMAL MAMMOGRAM: Primary | ICD-10-CM

## 2024-12-18 ENCOUNTER — HOSPITAL ENCOUNTER (OUTPATIENT)
Dept: RADIOLOGY | Facility: CLINIC | Age: 44
Discharge: HOME | End: 2024-12-18
Payer: COMMERCIAL

## 2024-12-18 ENCOUNTER — APPOINTMENT (OUTPATIENT)
Dept: OBSTETRICS AND GYNECOLOGY | Facility: CLINIC | Age: 44
End: 2024-12-18
Payer: COMMERCIAL

## 2024-12-18 VITALS
HEART RATE: 81 BPM | WEIGHT: 167 LBS | HEIGHT: 63 IN | BODY MASS INDEX: 29.59 KG/M2 | SYSTOLIC BLOOD PRESSURE: 132 MMHG | DIASTOLIC BLOOD PRESSURE: 94 MMHG | OXYGEN SATURATION: 96 %

## 2024-12-18 DIAGNOSIS — R92.8 ABNORMAL MAMMOGRAM: ICD-10-CM

## 2024-12-18 DIAGNOSIS — R23.2 HOT FLASHES: Primary | ICD-10-CM

## 2024-12-18 DIAGNOSIS — Z01.419 WELL WOMAN EXAM WITH ROUTINE GYNECOLOGICAL EXAM: ICD-10-CM

## 2024-12-18 PROCEDURE — 1036F TOBACCO NON-USER: CPT | Performed by: OBSTETRICS & GYNECOLOGY

## 2024-12-18 PROCEDURE — 3008F BODY MASS INDEX DOCD: CPT | Performed by: OBSTETRICS & GYNECOLOGY

## 2024-12-18 PROCEDURE — 76642 ULTRASOUND BREAST LIMITED: CPT | Mod: LEFT SIDE | Performed by: STUDENT IN AN ORGANIZED HEALTH CARE EDUCATION/TRAINING PROGRAM

## 2024-12-18 PROCEDURE — 99396 PREV VISIT EST AGE 40-64: CPT | Performed by: OBSTETRICS & GYNECOLOGY

## 2024-12-18 PROCEDURE — 77061 BREAST TOMOSYNTHESIS UNI: CPT | Mod: LEFT SIDE | Performed by: STUDENT IN AN ORGANIZED HEALTH CARE EDUCATION/TRAINING PROGRAM

## 2024-12-18 PROCEDURE — 76642 ULTRASOUND BREAST LIMITED: CPT | Mod: LT

## 2024-12-18 PROCEDURE — 77061 BREAST TOMOSYNTHESIS UNI: CPT | Mod: LT

## 2024-12-18 PROCEDURE — 76982 USE 1ST TARGET LESION: CPT | Mod: LT

## 2024-12-18 PROCEDURE — 77065 DX MAMMO INCL CAD UNI: CPT | Mod: LEFT SIDE | Performed by: STUDENT IN AN ORGANIZED HEALTH CARE EDUCATION/TRAINING PROGRAM

## 2024-12-18 RX ORDER — ESTRADIOL 0.07 MG/D
1 PATCH TRANSDERMAL WEEKLY
Qty: 4 PATCH | Refills: 11 | Status: SHIPPED | OUTPATIENT
Start: 2024-12-18 | End: 2025-12-18

## 2024-12-18 RX ORDER — PROGESTERONE 200 MG/1
200 CAPSULE ORAL NIGHTLY
Qty: 90 CAPSULE | Refills: 3 | Status: SHIPPED | OUTPATIENT
Start: 2024-12-18 | End: 2025-12-18

## 2024-12-18 ASSESSMENT — PATIENT HEALTH QUESTIONNAIRE - PHQ9
1. LITTLE INTEREST OR PLEASURE IN DOING THINGS: NOT AT ALL
SUM OF ALL RESPONSES TO PHQ9 QUESTIONS 1 & 2: 0
2. FEELING DOWN, DEPRESSED OR HOPELESS: NOT AT ALL

## 2024-12-18 NOTE — PROGRESS NOTES
"Patient presents for an annual exam  Last PAP 5/03/2023 NEG HPV-  Last Mammogram 12/11/2024 incomplete , has diagnostic and ultrasound ordered   Discuss HRT  Hotflashes    Jerri Loomis, FRANKLYNA    ANNUAL SUBJECTIVE    Shamikagarry Barajas is a 44 y.o. female who presents for annual exam today.  Her periods are absent, no period since July 2024 and FSH is 101 (done by PCP).  She is using  vasectomy for contraception and is happy with this.  Wants to discuss HRT.  Her chart notes a CVA in 2014, patient reports a stroke was never visualized by imaging, she was diagnosed with an occipital migraine, had a negative hypercoag workup except her Factor 8 level was high and so her PCP thought she may have had a clot that was never seen.  Her factor 8 level then normalized on subsequent blood draws and has never had any other issues since then.     PMH - depression,well controlled on lexapro, tried other antidepressants that did not work for her, asthma    PSH - none    OB history - G0  The patient has never been pregnant.    Last pap -   Normal HPV Negative 5/2023    Last mammogram - 12/2024, incomplete, has follow up views of left breast/US scheduled later today    Family history of breast or ovarian cancer - no    OBJECTIVE  BP (!) 132/94 (BP Location: Right arm, Patient Position: Sitting, BP Cuff Size: Adult)   Pulse 81   Ht 1.6 m (5' 3\")   Wt 75.8 kg (167 lb)   LMP 07/07/2024   SpO2 96%   BMI 29.58 kg/m²     General Appearance   - consistent with stated age, well groomed and cooperative    Integumentary  - skin warm and dry without rash    Head and Neck  - normalocephalic and neck supple    Chest and Lung Exam  - normal breathing effort, no respiratory distress    Breast  - symmetry noted, no mass palpable, no skin change and no nipple discharge.    Abdomen  - soft, nontender and no hepatomegaly, splenomegaly, or mass    Female Genitourinary  - vulva normal without rash or lesion, normal vaginal rugae, no " vaginal discharge, uterus normal size & no palpable masses, no adnexal mass, no adnexal tenderness, no cervical motion tenderness    Peripheral Vascular  - no edema present    ASSESSMENT/PLAN  44 y.o. yo G0 female who presents for annual exam.       Actions performed during this visit include:  - Clinical breast exam normal  - Clinical pelvic exam normal  - Pap: up to date  - Mammogram has follow up imaging today  - Long discussion regarding r/b/a of HRT.  Discussed a true CVA would be a contraindication to HRT, however this was never confirmed by any imaging and she had a negative hypercoag workup.  Discussed alternatives of effexor/brisdelle but patient already on an SSRI and well controlled, does not want to consider switching this at this point.  Discussed veozah as well, declines currently  At this time, she understands the risk of HRT and wants to proceed. Will do transdermal estrogen for lower clot risk and PO progesterone.  Will reassess symptoms in 6-8 wks.    Please return for your next visit in 1 year or earlier with any concerns.    Ginna Alamo MD

## 2025-01-23 DIAGNOSIS — J45.20 MILD INTERMITTENT ASTHMA, UNSPECIFIED WHETHER COMPLICATED (HHS-HCC): ICD-10-CM

## 2025-01-24 RX ORDER — FLUTICASONE PROPIONATE 50 MCG
2 SPRAY, SUSPENSION (ML) NASAL DAILY
Qty: 48 ML | Refills: 0 | Status: SHIPPED | OUTPATIENT
Start: 2025-01-24

## 2025-01-31 DIAGNOSIS — R23.2 HOT FLASHES: ICD-10-CM

## 2025-02-03 DIAGNOSIS — N95.1 HOT FLASHES DUE TO MENOPAUSE: Primary | ICD-10-CM

## 2025-02-03 RX ORDER — ESTRADIOL 0.05 MG/D
1 PATCH TRANSDERMAL 2 TIMES WEEKLY
Qty: 8 PATCH | Refills: 11 | Status: SHIPPED | OUTPATIENT
Start: 2025-02-03 | End: 2026-02-03

## 2025-02-06 DIAGNOSIS — R23.2 HOT FLASHES: ICD-10-CM

## 2025-02-06 RX ORDER — ESTRADIOL 0.05 MG/D
1 FILM, EXTENDED RELEASE TRANSDERMAL 2 TIMES WEEKLY
Qty: 24 PATCH | Refills: 3 | Status: SHIPPED | OUTPATIENT
Start: 2025-02-06 | End: 2026-02-06

## 2025-02-10 RX ORDER — ESTRADIOL 0.05 MG/D
1 FILM, EXTENDED RELEASE TRANSDERMAL 2 TIMES WEEKLY
Qty: 24 PATCH | Refills: 3 | Status: SHIPPED | OUTPATIENT
Start: 2025-02-10 | End: 2026-02-10

## 2025-02-11 ENCOUNTER — TELEPHONE (OUTPATIENT)
Dept: OBSTETRICS AND GYNECOLOGY | Facility: CLINIC | Age: 45
End: 2025-02-11
Payer: COMMERCIAL

## 2025-02-11 NOTE — TELEPHONE ENCOUNTER
Pharmacy called stating that the dose for the medication Climara was placed with the wrong dose. She stated that it should only be once a week and not twice a week. Please assist. The reference #[id number] and call back number is [phone]. The medication has been placed on hold.

## 2025-02-11 NOTE — TELEPHONE ENCOUNTER
I called and spoke with [facility]. Patient was switched from Climara due to needing twice weekly. Patient is aware that the medication was switched. We communicated via My Chart. Everything has been switched and verified.

## 2025-04-16 ENCOUNTER — APPOINTMENT (OUTPATIENT)
Dept: DERMATOLOGY | Facility: CLINIC | Age: 45
End: 2025-04-16
Payer: COMMERCIAL

## 2025-07-16 ENCOUNTER — OFFICE VISIT (OUTPATIENT)
Dept: URGENT CARE | Age: 45
End: 2025-07-16
Payer: COMMERCIAL

## 2025-07-16 DIAGNOSIS — J45.20 MILD INTERMITTENT ASTHMA, UNSPECIFIED WHETHER COMPLICATED (HHS-HCC): ICD-10-CM

## 2025-07-16 DIAGNOSIS — H10.9 BACTERIAL CONJUNCTIVITIS OF RIGHT EYE: Primary | ICD-10-CM

## 2025-07-16 DIAGNOSIS — J45.909 UNCOMPLICATED ASTHMA, UNSPECIFIED ASTHMA SEVERITY, UNSPECIFIED WHETHER PERSISTENT (HHS-HCC): ICD-10-CM

## 2025-07-16 RX ORDER — CIPROFLOXACIN HYDROCHLORIDE 3 MG/ML
1 SOLUTION/ DROPS OPHTHALMIC
Qty: 5 ML | Refills: 0 | Status: SHIPPED | OUTPATIENT
Start: 2025-07-16 | End: 2025-07-21

## 2025-07-16 NOTE — PROGRESS NOTES
Urgent Care Virtual Video Visit    Patient Location: [facility]  Provider Location: [facility]    44-year-old female presents today with irritation of the right eye.  [place]  She has slight swelling of her eyelid.  Tried to contact her ophthalmologist but they are remodeling and she could not get into ophthalmologist.  She endorses slight pharyngitis but she states this does not feel like strep throat that she had experienced 5 or 6 years ago.  She does not feel sick at this time but denies vision change.  She denies pain during extraocular motion.  I had patient track my finger during virtual visit and she did not experience any pain.  She denies sinus congestion.  She denies otalgia.  She denies chest pain or dyspnea.  He denies skin rash.  She has no other cause for concern or complaint.  Her only allergies are milk products and tree enthralled pollen.  We were concerned about her being a contact lens user and also being in late [place] and she may be experiencing bacterial conjunctivitis.  With the contact lenses she was placed on ciprofloxacin eyedrops to be used every 2 hours for 5 days until infection clears.  She could also use warm compresses.  I was less concerned about orbital cellulitis as she had no pain during pupil constriction or tracking extraocular motion.    I have communicated my name and active licensure. Video visit completed with realtime synchronous video/audio connection. Informed consent was obtained from the patient. Patient was made aware that my evaluation and diagnosis are limited due to the fact that we are not in the same room during the interview and that this is a virtual encounter that took place via videoconferencing. Patient verbalized understanding.     Patient disposition: Home    Electronically signed by TREV Aranda  1:47 PM

## 2025-08-29 ENCOUNTER — APPOINTMENT (OUTPATIENT)
Dept: DERMATOLOGY | Facility: CLINIC | Age: 45
End: 2025-08-29
Payer: COMMERCIAL

## 2025-08-29 DIAGNOSIS — L81.4 LENTIGO: ICD-10-CM

## 2025-08-29 DIAGNOSIS — D18.01 HEMANGIOMA OF SKIN: ICD-10-CM

## 2025-08-29 DIAGNOSIS — Z12.83 ENCOUNTER FOR SCREENING FOR MALIGNANT NEOPLASM OF SKIN: ICD-10-CM

## 2025-08-29 DIAGNOSIS — L30.9 DERMATITIS: ICD-10-CM

## 2025-08-29 DIAGNOSIS — D22.9 MELANOCYTIC NEVUS, UNSPECIFIED LOCATION: Primary | ICD-10-CM

## 2025-08-29 PROCEDURE — 1036F TOBACCO NON-USER: CPT | Performed by: NURSE PRACTITIONER

## 2025-08-29 PROCEDURE — 99213 OFFICE O/P EST LOW 20 MIN: CPT | Performed by: NURSE PRACTITIONER

## 2025-08-29 RX ORDER — HYDROCORTISONE 25 MG/G
CREAM TOPICAL
Qty: 180 G | Refills: 1 | Status: SHIPPED | OUTPATIENT
Start: 2025-08-29

## 2026-09-03 ENCOUNTER — APPOINTMENT (OUTPATIENT)
Dept: DERMATOLOGY | Facility: CLINIC | Age: 46
End: 2026-09-03
Payer: COMMERCIAL